# Patient Record
Sex: FEMALE | Race: WHITE | Employment: FULL TIME | URBAN - METROPOLITAN AREA
[De-identification: names, ages, dates, MRNs, and addresses within clinical notes are randomized per-mention and may not be internally consistent; named-entity substitution may affect disease eponyms.]

---

## 2017-07-05 ENCOUNTER — APPOINTMENT (OUTPATIENT)
Dept: DERMATOLOGY | Facility: IMAGING CENTER | Age: 67
End: 2017-07-05

## 2017-07-20 ENCOUNTER — APPOINTMENT (OUTPATIENT)
Dept: DERMATOLOGY | Facility: IMAGING CENTER | Age: 67
End: 2017-07-20

## 2017-10-25 ENCOUNTER — APPOINTMENT (OUTPATIENT)
Dept: DERMATOLOGY | Facility: IMAGING CENTER | Age: 67
End: 2017-10-25

## 2020-12-01 ENCOUNTER — NON-PROVIDER VISIT (OUTPATIENT)
Dept: HEALTH INFORMATION MANAGEMENT | Facility: MEDICAL CENTER | Age: 70
End: 2020-12-01
Payer: MEDICARE

## 2020-12-01 DIAGNOSIS — K70.11 ALCOHOLIC HEPATITIS WITH ASCITES: ICD-10-CM

## 2020-12-01 DIAGNOSIS — G93.40 ENCEPHALOPATHY, UNSPECIFIED: ICD-10-CM

## 2020-12-01 DIAGNOSIS — K70.31 ALCOHOLIC CIRRHOSIS OF LIVER WITH ASCITES (HCC): ICD-10-CM

## 2020-12-01 PROCEDURE — 97802 MEDICAL NUTRITION INDIV IN: CPT | Performed by: DIETITIAN, REGISTERED

## 2020-12-01 NOTE — PROGRESS NOTES
This evaluation was conducted via [employer] using secure and encrypted videoconferencing technology due to COVID19. The patient was in a private location in the state of [place].    The patient's identity was confirmed and verbal consent was obtained for this virtual visit.      12/1/2020    Pcp Pt States None  70 y.o.   Time in/out: 1:10-1:53    Anthropometrics/Objective  There were no vitals filed for this visit.    There is no height or weight on file to calculate BMI. Stated Goal Weight: NA  Estimated Caloric needs NA Kcal   See comprehensive patient history form for further information     Subjective:  - hepatic encephalopathy  - , Cesar, does most of her care  - he makes the food generally  - uses No Salt ([employer] alternative)  - on diuretics  - take a MVI and splits 2x a day  - takes D3 and iron as well  - low albumin  - paracentesis 2 weeks ago (9L taken off)  - 1500 mL fluid restriction per [place]     Nutrition Diagnosis (PES Statement)  Problem (Nutrition diagnosis)  Clinical: Altered nutrition-related lab values    Etiology(Addresses the cause,contributing factors)  Liver dysfunction    Signs/Symptoms (Address observations and stated info: subjective and objective data)  Client history: Condition(s) associated with a diagnosis or treatment of hepatic cirrhosis, hepatic encephalopathy    Client history:  Condition(s) associated with a diagnosis or treatment (specify) alcoholic hepatic cirrhosis, hepatic encephalopathy      Biochemical data, medical test and procedures  No results found for: HBA1C@  No results found for: POCGLUCOSE  No results found for: CHOLSTRLTOT, LDL, HDL, TRIGLYCERIDE      Nutrition Intervention    Meal and Snack  Recommend a general/healthful low sodium diet    Comprehensive Nutrition education Instruction or training leading to in-depth nutrition related knowledge about:  Combine carb, protein and fat at each meal, Portion control, Sweets and alcohol in moderation and Heart-healthy  guidelines    Monitoring & Evaluation Plan    Behavioral-Environmental:  Behavior: choose low sodium products    Food / Nutrient Intake:  Food intake; HBV proteins and variety of fruits and veggies    Physical Signs / Symptoms:  Other: decrease progression of liver symptoms     Assessment Notes:  Lana and her  Cesar have been monitoring Lana's liver status and would like to discuss nutrition intervention. Cesar does a great job of being inclusive in their diet, getting variety in, choosing HBV proteins. Gets fruits and veggies in at snacks and meals, ensures variety, makes smoothies with greens as well. Provided Cesar with [facility] [employer] nutrition info for cirrhosis. We also reviewed recommendations for recovering alcoholics from the [facility] as well for potential deficiencies and recommendations that can be of benefit to prevent relapse and incorporate more healthful practices as a whole. Cesar does a great job to ensure Ryleys intake is inclusive of varied vitamin and mineral content from her foods. He label reads for sodium content. He had questions regarding sodium intake which we answered at this time. Recommended they spread intake out throughout the day for best utilization of nutrients. Encouraged Cesar to reach out with and questions or concerns.     Follow up: PRN

## 2021-01-09 ENCOUNTER — HOSPITAL ENCOUNTER (OUTPATIENT)
Facility: MEDICAL CENTER | Age: 71
End: 2021-01-09
Payer: COMMERCIAL

## 2021-01-09 LAB
COVID ORDER STATUS COVID19: NORMAL
SARS-COV-2 RNA RESP QL NAA+PROBE: NOTDETECTED
SPECIMEN SOURCE: NORMAL

## 2021-03-03 DIAGNOSIS — Z23 NEED FOR VACCINATION: ICD-10-CM
